# Patient Record
Sex: MALE | Race: BLACK OR AFRICAN AMERICAN | Employment: OTHER | URBAN - METROPOLITAN AREA
[De-identification: names, ages, dates, MRNs, and addresses within clinical notes are randomized per-mention and may not be internally consistent; named-entity substitution may affect disease eponyms.]

---

## 2022-03-18 PROBLEM — I48.0 PAROXYSMAL ATRIAL FIBRILLATION (HCC): Status: ACTIVE | Noted: 2017-04-20

## 2022-03-18 PROBLEM — I50.32 CHRONIC HEART FAILURE WITH PRESERVED EJECTION FRACTION (HFPEF) (HCC): Status: ACTIVE | Noted: 2022-01-01

## 2022-03-19 PROBLEM — I10 ESSENTIAL HYPERTENSION: Status: ACTIVE | Noted: 2017-07-10

## 2022-06-27 ENCOUNTER — OFFICE VISIT (OUTPATIENT)
Dept: CARDIOLOGY CLINIC | Age: 87
End: 2022-06-27
Payer: MEDICARE

## 2022-06-27 VITALS
HEIGHT: 67 IN | DIASTOLIC BLOOD PRESSURE: 62 MMHG | WEIGHT: 200.4 LBS | BODY MASS INDEX: 31.45 KG/M2 | SYSTOLIC BLOOD PRESSURE: 114 MMHG | HEART RATE: 60 BPM

## 2022-06-27 DIAGNOSIS — I48.11 LONGSTANDING PERSISTENT ATRIAL FIBRILLATION (HCC): Primary | ICD-10-CM

## 2022-06-27 DIAGNOSIS — I95.89 IATROGENIC HYPOTENSION: ICD-10-CM

## 2022-06-27 DIAGNOSIS — I50.32 CHRONIC HEART FAILURE WITH PRESERVED EJECTION FRACTION (HFPEF) (HCC): ICD-10-CM

## 2022-06-27 PROCEDURE — 99214 OFFICE O/P EST MOD 30 MIN: CPT | Performed by: INTERNAL MEDICINE

## 2022-06-27 PROCEDURE — 1036F TOBACCO NON-USER: CPT | Performed by: INTERNAL MEDICINE

## 2022-06-27 PROCEDURE — G8417 CALC BMI ABV UP PARAM F/U: HCPCS | Performed by: INTERNAL MEDICINE

## 2022-06-27 PROCEDURE — 1123F ACP DISCUSS/DSCN MKR DOCD: CPT | Performed by: INTERNAL MEDICINE

## 2022-06-27 PROCEDURE — G8427 DOCREV CUR MEDS BY ELIG CLIN: HCPCS | Performed by: INTERNAL MEDICINE

## 2022-06-27 RX ORDER — BRIMONIDINE TARTRATE 2 MG/ML
1 SOLUTION/ DROPS OPHTHALMIC 3 TIMES DAILY
COMMUNITY

## 2022-06-27 RX ORDER — CHOLECALCIFEROL (VITAMIN D3) 125 MCG
5 CAPSULE ORAL DAILY
COMMUNITY

## 2022-06-27 ASSESSMENT — ENCOUNTER SYMPTOMS: WHEEZING: 1

## 2022-06-27 NOTE — PROGRESS NOTES
[facility] CARDIOLOGY  [address], [address]  PHONE: [phone]      22    NAME:  Torey Franks  : 9/3/1931  MRN: [id number]         SUBJECTIVE:   Torey Franks is a 80 y.o. male seen for a follow up visit regarding the following:     Chief Complaint   Patient presents with    Congestive Heart Failure    Irregular Heart Beat    Hypertension            HPI:  Follow up  Congestive Heart Failure, Irregular Heart Beat, and Hypertension   . Follow up VT in setting of metabolic derangement with SIADH in 2018, afib rate controlled and anticoagulated, COPD  and HFpEF. Says he's been well until this morning, while he was sitting still in his chair he suddenly felt a \"jerk\" and felt back to normal as soon as he jerked. No pain, pressure or palpitations. He wears oxygen /. BP has been low at home. His wife says they've seen it as low as 90 systolic, highest has been 120/80          Past cardiac history:   2015 - VT occurred in the setting of significant metabolic derangement. All in [place]. Was placed on amiodarone, had outpatient follow up there with 30 day ambulatory monitoring showing no recurrence, with isolated PVC's. (amiodarone ultimately  discontinued)   Cardiac cath - no coronary obstruction, normal LVEF 60%. 2017 - 30 day monitor, single episode of rate controlled atrial fib -rate controlled, anticoagulated   2021- normal perfusion study ([facility])           Past Medical History, Past Surgical History, Family history, Social History, and Medications were all reviewed with the patient today and updated as necessary. Prior to Admission medications    Medication Sig Start Date End Date Taking?  Authorizing Provider   brimonidine (ALPHAGAN) 0.2 % ophthalmic solution 1 drop 3 times daily   Yes Historical Provider, MD   melatonin 5 MG TABS tablet Take 5 mg by mouth daily   Yes Historical Provider, MD   albuterol sulfate  (90 Base) MCG/ACT inhaler Inhale 2 puffs into the lungs every 6 hours as needed   Yes Ar Automatic Reconciliation   apixaban (ELIQUIS) 5 MG TABS tablet Take 5 mg by mouth 2 times daily 7/26/21  Yes Ar Automatic Reconciliation   budesonide-formoterol (SYMBICORT) 160-4.5 MCG/ACT AERO Inhale 2 puffs into the lungs as needed   Yes Ar Automatic Reconciliation   ergocalciferol (ERGOCALCIFEROL) 1.25 MG (35331 UT) capsule Take 50,000 Units by mouth every 7 days   Yes Ar Automatic Reconciliation   furosemide (LASIX) 40 MG tablet Take by mouth 3/29/22  Yes Ar Automatic Reconciliation   glimepiride (AMARYL) 1 MG tablet Take by mouth every morning (before breakfast)   Yes Ar Automatic Reconciliation   linaclotide (LINZESS) 145 MCG capsule Take by mouth as needed   Yes Ar Automatic Reconciliation   losartan (COZAAR) 50 MG tablet Take by mouth daily   Yes Ar Automatic Reconciliation   magnesium oxide (MAG-OX) 400 MG tablet Take 400 mg by mouth daily 6/19/20  Yes Ar Automatic Reconciliation   metoprolol tartrate (LOPRESSOR) 25 MG tablet Take 12.5 mg by mouth 2 times daily   Yes Ar Automatic Reconciliation   potassium chloride (KLOR-CON) 10 MEQ extended release tablet Take 10 mEq by mouth daily 6/17/21  Yes Ar Automatic Reconciliation   rosuvastatin (CRESTOR) 10 MG tablet Take 10 mg by mouth   Yes Ar Automatic Reconciliation   gabapentin (NEURONTIN) 300 MG capsule Take 300 mg by mouth. Patient not taking: Reported on 6/27/2022    Ar Automatic Reconciliation     Allergies   Allergen Reactions    Penicillins Other (See Comments)     constipation     Past Medical History:   Diagnosis Date    Chest pain 6/7/2016    OV:12/4/15 2/25/13: [facility]: negative for ischemia. Normal LVEF.  Feb 2015 - Cath ([place]) - no coronary obstruction, normal LVEF    Gastroesophageal reflux disease     Hyperlipemia     Hypertension     Ventricular tachycardia ([facility] [address] 6/7/2016    OV:12/4/2015 Feb 2015 - occurred in the setting of significant metabolic derangement. All in [place]. Was placed on amiodarone, had outpatient follow up there with 30 day ambulatory monitoring showing no recurrence, with isolated PVC's. Amiodarone started in hospital there,( I titrated down to once daily while procuring records), spironolactone and benicar discontinued. Cardiac cath - no coron     History reviewed. No pertinent surgical history. Family History   Problem Relation Age of Onset    Stroke Mother 80     Social History     Tobacco Use    Smoking status: Former Smoker     Quit date: 1983     Years since quittin.5    Smokeless tobacco: Never Used   Substance Use Topics    Alcohol use: Not on file       ROS:    Review of Systems   Cardiovascular: Negative for chest pain. Respiratory: Positive for wheezing (admits hasn't been using his inhalers, will resume today). PHYSICAL EXAM:   /62   Pulse 60   Ht 5' 7\" (1.702 m)   Wt 200 lb 6.4 oz (90.9 kg)   BMI 31.39 kg/m²      Wt Readings from Last 3 Encounters:   22 200 lb 6.4 oz (90.9 kg)   22 208 lb 6.4 oz (94.5 kg)   22 203 lb (92.1 kg)     BP Readings from Last 3 Encounters:   22 114/62   22 136/70   22 114/60     Pulse Readings from Last 3 Encounters:   22 60   22 76   22 64           Physical Exam  Constitutional:       Appearance: Normal appearance. HENT:      Head: Normocephalic and atraumatic. Eyes:      Conjunctiva/sclera: Conjunctivae normal.   Neck:      Vascular: No carotid bruit. Cardiovascular:      Rate and Rhythm: Normal rate. Rhythm irregular. Heart sounds: No murmur heard. No friction rub. No gallop. Pulmonary:      Effort: No respiratory distress. Breath sounds: Wheezing (diffuse end expiratory wheeze) present. No rales. Musculoskeletal:         General: Swelling (trace/1+ below knees bilaterally) present. Cervical back: Neck supple. Skin:     General: Skin is warm and dry.    Neurological: General: No focal deficit present. Mental Status: He is alert. Psychiatric:         Mood and Affect: Mood normal.         Behavior: Behavior normal.         Medical problems and test results were reviewed with the patient today. DATA REVIEW     5/10/22 1421    BUN 7 - 25 mg/dL 17    Sodium 133 - 146 mmol/L 145    Potassium 3.5 - 5.2 mmol/L 3.9    Chloride 100 - 111 mmol/L 106    CO2 23.0 - 32.6 mmol/L 33.2 High     Glucose 70 - 100 mg/dL 74    Creatinine 0.47 - 1.35 mg/dL 1.20    Calcium 8.9 - 10.3 mg/dL 9.2    Total Protein 6.1 - 8.0 g/dL 6.7    Albumin 3.5 - 4.9 g/dL 4.2    ALT (SGPT) 8 - 39 IU/L 14    Alkaline Phosphatase 36 - 108 IU/L 62    AST 12 - 33 IU/L 19    Total Bilirubin 0.0 - 1.5 mg/dL 0.8    BUN/Creatinine Ratio 8 - 23 14    Osmolality Calc 270 - 350 mOsm/kg 289    Anion Gap 6 - 13 mmol/L 6    Globulin 2.0 - 4.5 g/dL 2.5    A/G Ratio 0.9 - 2.4 1.7    Estimated GFR-African American mL/min/1.73 m^2 >60    Estimated GFR-Other mL/min/1.73 m^2 57                 EKG        CXR/IMAGING        DEVICE INTERROGATION        OUTSIDE RECORDS REVIEW    Records from outside providers have been reviewed and summarized as noted in the HPI, past history and data review sections of this note, and reviewed with patient. .       ASSESSMENT and PLAN    Kala Andrews was seen today for congestive heart failure, irregular heart beat and hypertension. Diagnoses and all orders for this visit:    Longstanding persistent atrial fibrillation ([facility] [address]  -     Basic Metabolic Panel; Future    Iatrogenic hypotension    Chronic heart failure with preserved ejection fraction (HFpEF) ([facility])          IMPRESSION:    Possibly symptomatic hypotension, stop amlodipine. Continuing low dose BB for rate control     Rate controlled, anticoagulated, continue meds, check Cr again in 6 months, reduce Eliquis to 2.5 mg if climbs > 1.5    NYHA II continue meds, diuresis with caution        Return in about 6 months (around 12/27/2022). Thank you for allowing me to participate in this patient's care. Please call or contact me if there are any questions or concerns regarding the above.       Ashley Alvarado MD  06/27/22  12:34 PM

## 2022-06-27 NOTE — PATIENT INSTRUCTIONS
Patient Education        Atrial Fibrillation: Care Instructions  Your Care Instructions     Atrial fibrillation is an irregular and often fast heartbeat. Treating this condition is important for several reasons. It can cause blood clots, which can travel from your heart to your brain and cause a stroke. If you have a fast heartbeat, you may feel lightheaded, dizzy, and weak. An irregular heartbeatcan also increase your risk for heart failure. Atrial fibrillation is often the result of another heart condition, such as high blood pressure or coronary artery disease. Making changes to improve yourheart condition will help you stay healthy and active. Follow-up care is a key part of your treatment and safety. Be sure to make and go to all appointments, and call your doctor if you are having problems. It's also a good idea to know your test results and keep alist of the medicines you take. How can you care for yourself at home? Medicines     Take your medicines exactly as prescribed. Call your doctor if you think you are having a problem with your medicine. You will get more details on the specific medicines your doctor prescribes.      If your doctor has given you a blood thinner to prevent a stroke, be sure you get instructions about how to take your medicine safely. Blood thinners can cause serious bleeding problems.      Do not take any vitamins, over-the-counter drugs, or herbal products without talking to your doctor first.   Lifestyle changes     Do not smoke. Smoking can increase your chance of a stroke and heart attack. If you need help quitting, talk to your doctor about stop-smoking programs and medicines. These can increase your chances of quitting for good.      Eat a heart-healthy diet.      Stay at a healthy weight. Lose weight if you need to.      Limit alcohol to 2 drinks a day for men and 1 drink a day for women. Too much alcohol can cause health problems.      Avoid colds and flu.  Get a pneumococcal vaccine shot. If you have had one before, ask your doctor whether you need another dose. Get a flu shot every year. If you must be around people with colds or flu, wash your hands often. Activity     If your doctor recommends it, get more exercise. Walking is a good choice. Bit by bit, increase the amount you walk every day. Try for at least 30 minutes on most days of the week. You also may want to swim, bike, or do other activities. Your doctor may suggest that you join a cardiac rehabilitation program so that you can have help increasing your physical activity safely.      Start light exercise if your doctor says it is okay. Even a small amount will help you get stronger, have more energy, and manage stress. Walking is an easy way to get exercise. Start out by walking a little more than you did in the hospital. Gradually increase the amount you walk.      When you exercise, watch for signs that your heart is working too hard. You are pushing too hard if you cannot talk while you are exercising. If you become short of breath or dizzy or have chest pain, sit down and rest immediately.      Check your pulse regularly. Place two fingers on the artery at the palm side of your wrist, in line with your thumb. If your heartbeat seems uneven or fast, talk to your doctor. When should you call for help? Call 911 anytime you think you may need emergency care. For example, call if:     You have symptoms of a heart attack. These may include:  ? Chest pain or pressure, or a strange feeling in the chest.  ? Sweating. ? Shortness of breath. ? Nausea or vomiting. ? Pain, pressure, or a strange feeling in the back, neck, jaw, or upper belly or in one or both shoulders or arms. ? Lightheadedness or sudden weakness. ? A fast or irregular heartbeat. After you call 911, the  may tell you to chew 1 adult-strength or 2 to 4 low-dose aspirin. Wait for an ambulance.  Do not try to drive yourself.      You have symptoms of a stroke. These may include:  ? Sudden numbness, tingling, weakness, or loss of movement in your face, arm, or leg, especially on only one side of your body. ? Sudden vision changes. ? Sudden trouble speaking. ? Sudden confusion or trouble understanding simple statements. ? Sudden problems with walking or balance. ? A sudden, severe headache that is different from past headaches.      You passed out (lost consciousness). Call your doctor now or seek immediate medical care if:     You have new or increased shortness of breath.      You feel dizzy or lightheaded, or you feel like you may faint.      Your heart rate becomes irregular.      You can feel your heart flutter in your chest or skip heartbeats. Tell your doctor if these symptoms are new or worse. Watch closely for changes in your health, and be sure to contact your doctor ifyou have any problems. Where can you learn more? Go to https://MC10.NxThera. org and sign in to your [employer] account. Enter U020 in the NanoInk box to learn more about \"Atrial Fibrillation: Care Instructions. \"     If you do not have an account, please click on the \"Sign Up Now\" link. Current as of: January 10, 2022               Content Version: 13.3  © 2006-2022 Healthwise, Incorporated. Care instructions adapted under license by [facility] ([facility]). If you have questions about a medical condition or this instruction, always ask your healthcare professional. [place] [address] any warranty or liability for your use of this information.

## 2022-07-06 ENCOUNTER — TELEPHONE (OUTPATIENT)
Dept: CARDIOLOGY CLINIC | Age: 87
End: 2022-07-06

## 2022-07-06 NOTE — TELEPHONE ENCOUNTER
Pt's EC states pt c/o R. Foot swelling for \"a couple of days. \" states it is intermittent. Goes down at bedtime and comes back when he's up and comes back when he gets up. Admits to intermittent ankle pain in RLE. Weight is down 4#. Recommend calling PCP back and making them aware it does not sound like his heart. States he has appt with PCP this afternoon. Will have them eval at that time.

## 2022-09-09 DIAGNOSIS — I48.11 LONGSTANDING PERSISTENT ATRIAL FIBRILLATION (HCC): ICD-10-CM

## 2022-09-09 LAB
ANION GAP SERPL CALC-SCNC: 2 MMOL/L (ref 4–13)
BUN SERPL-MCNC: 20 MG/DL (ref 8–23)
CALCIUM SERPL-MCNC: 9 MG/DL (ref 8.3–10.4)
CHLORIDE SERPL-SCNC: 105 MMOL/L (ref 101–110)
CO2 SERPL-SCNC: 38 MMOL/L (ref 21–32)
CREAT SERPL-MCNC: 1.1 MG/DL (ref 0.8–1.5)
GLUCOSE SERPL-MCNC: 78 MG/DL (ref 65–100)
POTASSIUM SERPL-SCNC: 3.6 MMOL/L (ref 3.5–5.1)
SODIUM SERPL-SCNC: 145 MMOL/L (ref 136–145)

## 2022-09-29 ENCOUNTER — OFFICE VISIT (OUTPATIENT)
Dept: CARDIOLOGY CLINIC | Age: 87
End: 2022-09-29
Payer: MEDICARE

## 2022-09-29 VITALS
BODY MASS INDEX: 32.15 KG/M2 | HEIGHT: 67 IN | DIASTOLIC BLOOD PRESSURE: 66 MMHG | WEIGHT: 204.8 LBS | SYSTOLIC BLOOD PRESSURE: 132 MMHG | HEART RATE: 83 BPM

## 2022-09-29 DIAGNOSIS — N18.31 STAGE 3A CHRONIC KIDNEY DISEASE (HCC): ICD-10-CM

## 2022-09-29 DIAGNOSIS — I48.0 PAROXYSMAL ATRIAL FIBRILLATION (HCC): ICD-10-CM

## 2022-09-29 DIAGNOSIS — I47.20 VENTRICULAR TACHYCARDIA: ICD-10-CM

## 2022-09-29 DIAGNOSIS — I50.32 CHRONIC HEART FAILURE WITH PRESERVED EJECTION FRACTION (HFPEF) (HCC): Primary | ICD-10-CM

## 2022-09-29 DIAGNOSIS — I10 ESSENTIAL HYPERTENSION: ICD-10-CM

## 2022-09-29 PROCEDURE — 99214 OFFICE O/P EST MOD 30 MIN: CPT | Performed by: INTERNAL MEDICINE

## 2022-09-29 PROCEDURE — G8417 CALC BMI ABV UP PARAM F/U: HCPCS | Performed by: INTERNAL MEDICINE

## 2022-09-29 PROCEDURE — 1036F TOBACCO NON-USER: CPT | Performed by: INTERNAL MEDICINE

## 2022-09-29 PROCEDURE — 93000 ELECTROCARDIOGRAM COMPLETE: CPT | Performed by: INTERNAL MEDICINE

## 2022-09-29 PROCEDURE — 1123F ACP DISCUSS/DSCN MKR DOCD: CPT | Performed by: INTERNAL MEDICINE

## 2022-09-29 PROCEDURE — G8427 DOCREV CUR MEDS BY ELIG CLIN: HCPCS | Performed by: INTERNAL MEDICINE

## 2022-09-29 RX ORDER — AMLODIPINE BESYLATE 5 MG/1
5 TABLET ORAL DAILY
COMMUNITY

## 2022-09-29 ASSESSMENT — ENCOUNTER SYMPTOMS: BACK PAIN: 1

## 2022-09-29 NOTE — PATIENT INSTRUCTIONS
Patient Education        Atrial Fibrillation: Care Instructions  Your Care Instructions     Atrial fibrillation is an irregular and often fast heartbeat. Treating this condition is important for several reasons. It can cause blood clots, which can travel from your heart to your brain and cause a stroke. If you have a fast heartbeat, you may feel lightheaded, dizzy, and weak. An irregular heartbeat can also increase your risk for heart failure. Atrial fibrillation is often the result of another heart condition, such as high blood pressure or coronary artery disease. Making changes to improve your heart condition will help you stay healthy and active. Follow-up care is a key part of your treatment and safety. Be sure to make and go to all appointments, and call your doctor if you are having problems. It's also a good idea to know your test results and keep a list of the medicines you take. How can you care for yourself at home? Medicines    Take your medicines exactly as prescribed. Call your doctor if you think you are having a problem with your medicine. You will get more details on the specific medicines your doctor prescribes. If your doctor has given you a blood thinner to prevent a stroke, be sure you get instructions about how to take your medicine safely. Blood thinners can cause serious bleeding problems. Do not take any vitamins, over-the-counter drugs, or herbal products without talking to your doctor first.   Lifestyle changes    Do not smoke. Smoking can increase your chance of a stroke and heart attack. If you need help quitting, talk to your doctor about stop-smoking programs and medicines. These can increase your chances of quitting for good. Eat a heart-healthy diet. Stay at a healthy weight. Lose weight if you need to. Limit alcohol to 2 drinks a day for men and 1 drink a day for women. Too much alcohol can cause health problems. Avoid colds and flu.  Get a pneumococcal vaccine shot. If you have had one before, ask your doctor whether you need another dose. Get a flu shot every year. If you must be around people with colds or flu, wash your hands often. Activity    If your doctor recommends it, get more exercise. Walking is a good choice. Bit by bit, increase the amount you walk every day. Try for at least 30 minutes on most days of the week. You also may want to swim, bike, or do other activities. Your doctor may suggest that you join a cardiac rehabilitation program so that you can have help increasing your physical activity safely. Start light exercise if your doctor says it is okay. Even a small amount will help you get stronger, have more energy, and manage stress. Walking is an easy way to get exercise. Start out by walking a little more than you did in the hospital. Gradually increase the amount you walk. When you exercise, watch for signs that your heart is working too hard. You are pushing too hard if you cannot talk while you are exercising. If you become short of breath or dizzy or have chest pain, sit down and rest immediately. Check your pulse regularly. Place two fingers on the artery at the palm side of your wrist, in line with your thumb. If your heartbeat seems uneven or fast, talk to your doctor. When should you call for help? Call 911 anytime you think you may need emergency care. For example, call if:    You have symptoms of a heart attack. These may include:  Chest pain or pressure, or a strange feeling in the chest.  Sweating. Shortness of breath. Nausea or vomiting. Pain, pressure, or a strange feeling in the back, neck, jaw, or upper belly or in one or both shoulders or arms. Lightheadedness or sudden weakness. A fast or irregular heartbeat. After you call 911, the  may tell you to chew 1 adult-strength or 2 to 4 low-dose aspirin. Wait for an ambulance. Do not try to drive yourself. You have symptoms of a stroke.  These may include:  Sudden numbness, tingling, weakness, or loss of movement in your face, arm, or leg, especially on only one side of your body. Sudden vision changes. Sudden trouble speaking. Sudden confusion or trouble understanding simple statements. Sudden problems with walking or balance. A sudden, severe headache that is different from past headaches. You passed out (lost consciousness). Call your doctor now or seek immediate medical care if:    You have new or increased shortness of breath. You feel dizzy or lightheaded, or you feel like you may faint. Your heart rate becomes irregular. You can feel your heart flutter in your chest or skip heartbeats. Tell your doctor if these symptoms are new or worse. Watch closely for changes in your health, and be sure to contact your doctor if you have any problems. Where can you learn more? Go to https://Askablogrpemeleweb.1000 [employer]ks. org and sign in to your [employer] account. Enter U020 in the Zitra.com box to learn more about \"Atrial Fibrillation: Care Instructions. \"     If you do not have an account, please click on the \"Sign Up Now\" link. Current as of: January 10, 2022               Content Version: 13.4  © 8904-8036 Healthwise, Incorporated. Care instructions adapted under license by [facility] ([facility]). If you have questions about a medical condition or this instruction, always ask your healthcare professional. [address] any warranty or liability for your use of this information.        .bfwb

## 2022-09-29 NOTE — PROGRESS NOTES
[facility] CARDIOLOGY  [address] [place] [address], [address], [address] [place] [address]  PHONE: [phone]      22    NAME:  Héctor Minor  : 9/3/1931  MRN: [id number]         SUBJECTIVE:   Héctor Minor is a 80 y.o. male seen for a follow up visit regarding the following:     Chief Complaint   Patient presents with    Atrial Fibrillation              HPI:  Follow up  Atrial Fibrillation   . Follow up VT in setting of metabolic derangement with SIADH in 2018, afib rate controlled and anticoagulated, COPD  and HFpEF. Stopped amlodipine last visit for symptomatic hypotension     He feels well for his 89 years. He's had no further hypotension. Had some high readings but started taking beet juice which has helped. Diffuse severe back and neck pain, was a  in his youth       Past cardiac history:   2015 - VT occurred in the setting of significant metabolic derangement. All in [place]. Was placed on amiodarone, had outpatient follow up there with 30 day ambulatory monitoring showing no recurrence, with isolated PVC's. (amiodarone ultimately  discontinued)   Cardiac cath - no coronary obstruction, normal LVEF 60%.     2017 - 30 day monitor, single episode of rate controlled atrial fib -rate controlled, anticoagulated   2021- normal perfusion study ([facility])           Key CAD CHF Meds            amLODIPine (NORVASC) 5 MG tablet (Taking)    Class: Historical Med    apixaban (ELIQUIS) 5 MG TABS tablet (Taking)    Class: Historical Med    furosemide (LASIX) 40 MG tablet (Taking)    Class: Historical Med    losartan (COZAAR) 50 MG tablet (Taking)    Class: Historical Med    metoprolol tartrate (LOPRESSOR) 25 MG tablet (Taking)    Class: Historical Med    rosuvastatin (CRESTOR) 10 MG tablet (Taking)    Class: Historical Med              Past Medical History, Past Surgical History, Family history, Social History, and Medications were all reviewed with the patient today and updated as necessary. Prior to Admission medications    Medication Sig Start Date End Date Taking?  Authorizing Provider   amLODIPine (NORVASC) 5 MG tablet Take 5 mg by mouth daily   Yes Historical Provider, MD   brimonidine (ALPHAGAN) 0.2 % ophthalmic solution 1 drop 3 times daily   Yes Historical Provider, MD   melatonin 5 MG TABS tablet Take 5 mg by mouth daily   Yes Historical Provider, MD   albuterol sulfate  (90 Base) MCG/ACT inhaler Inhale 2 puffs into the lungs every 6 hours as needed   Yes Ar Automatic Reconciliation   apixaban (ELIQUIS) 5 MG TABS tablet Take 5 mg by mouth 2 times daily 7/26/21  Yes Ar Automatic Reconciliation   budesonide-formoterol (SYMBICORT) 160-4.5 MCG/ACT AERO Inhale 2 puffs into the lungs as needed   Yes Ar Automatic Reconciliation   ergocalciferol (ERGOCALCIFEROL) 1.25 MG (72792 UT) capsule Take 50,000 Units by mouth every 7 days   Yes Ar Automatic Reconciliation   furosemide (LASIX) 40 MG tablet Take 40 mg by mouth 1 qam and 1/2 at lunch 3/29/22  Yes Ar Automatic Reconciliation   glimepiride (AMARYL) 1 MG tablet Take by mouth every morning (before breakfast)   Yes Ar Automatic Reconciliation   linaclotide (LINZESS) 145 MCG capsule Take by mouth as needed   Yes Ar Automatic Reconciliation   losartan (COZAAR) 50 MG tablet Take by mouth daily   Yes Ar Automatic Reconciliation   magnesium oxide (MAG-OX) 400 MG tablet Take 400 mg by mouth daily 6/19/20  Yes Ar Automatic Reconciliation   metoprolol tartrate (LOPRESSOR) 25 MG tablet Take 12.5 mg by mouth 2 times daily   Yes Ar Automatic Reconciliation   potassium chloride (KLOR-CON) 10 MEQ extended release tablet Take 10 mEq by mouth daily 6/17/21  Yes Ar Automatic Reconciliation   rosuvastatin (CRESTOR) 10 MG tablet Take 10 mg by mouth   Yes Ar Automatic Reconciliation     Allergies   Allergen Reactions    Penicillins Other (See Comments)     constipation     Past Medical History:   Diagnosis Date    Chest pain 6/7/2016 OV:12/4/15 2/25/13: [facility]: negative for ischemia. Normal LVEF. 2015 - Cath ([place]) - no coronary obstruction, normal LVEF    Gastroesophageal reflux disease     Hyperlipemia     Hypertension     Ventricular tachycardia ([address] 2016    OV:2015 - occurred in the setting of significant metabolic derangement. All in [facility]. Was placed on amiodarone, had outpatient follow up there with 30 day ambulatory monitoring showing no recurrence, with isolated PVC's. Amiodarone started in hospital there,( I titrated down to once daily while procuring records), spironolactone and benicar discontinued. Cardiac cath - no coron     History reviewed. No pertinent surgical history. Family History   Problem Relation Age of Onset    Stroke Mother 80     Social History     Tobacco Use    Smoking status: Former     Types: Cigarettes     Quit date: 1983     Years since quittin.7    Smokeless tobacco: Never   Substance Use Topics    Alcohol use: Not on file       ROS:    Review of Systems   Constitutional: Positive for malaise/fatigue. Cardiovascular:  Negative for chest pain. Musculoskeletal:  Positive for arthritis, back pain, joint pain and neck pain. PHYSICAL EXAM:   /66   Pulse 83   Ht 5' 7\" (1.702 m)   Wt 204 lb 12.8 oz (92.9 kg)   BMI 32.08 kg/m²      Wt Readings from Last 3 Encounters:   22 204 lb 12.8 oz (92.9 kg)   22 200 lb 6.4 oz (90.9 kg)   22 208 lb 6.4 oz (94.5 kg)     BP Readings from Last 3 Encounters:   22 132/66   22 114/62   22 136/70     Pulse Readings from Last 3 Encounters:   22 83   22 60   22 76           Physical Exam  Constitutional:       Appearance: Normal appearance. HENT:      Head: Normocephalic and atraumatic. Eyes:      Conjunctiva/sclera: Conjunctivae normal.   Neck:      Vascular: No carotid bruit. Cardiovascular:      Rate and Rhythm: Normal rate. Rhythm irregular. FrequentExtrasystoles are present. Heart sounds: No murmur heard. No friction rub. No gallop. Pulmonary:      Effort: No respiratory distress. Breath sounds: No wheezing or rales. Musculoskeletal:         General: Swelling (trace pitting bilateral ankels) present. Cervical back: Neck supple. Skin:     General: Skin is warm and dry. Neurological:      General: No focal deficit present. Mental Status: He is alert. Psychiatric:         Mood and Affect: Mood normal.         Behavior: Behavior normal.       Medical problems and test results were reviewed with the patient today. DATA REVIEW    BMP  Lab Results   Component Value Date/Time     09/09/2022 12:34 PM    K 3.6 09/09/2022 12:34 PM     09/09/2022 12:34 PM    CO2 38 09/09/2022 12:34 PM    BUN 20 09/09/2022 12:34 PM    CREATININE 1.10 09/09/2022 12:34 PM    GLUCOSE 78 09/09/2022 12:34 PM    CALCIUM 9.0 09/09/2022 12:34 PM       9/21/22 1036    Sodium 133 - 146 mmol/L 143    Potassium 3.5 - 5.2 mmol/L 4.2    Chloride 100 - 111 mmol/L 105    Carbon Dioxide 23 - 32.6 mmol/L 30.0    Anion Gap 6 - 13 mmol/L 8    Osmolality Calculation 271 - 318 mOsm/kg 287.29    Urea Nitrogen 7 - 25 mg/dL 20    Creatinine 0.47 - 1.35 mg/dL 1.21    BUN/Creat Ratio 8 - 20 NULL 16.53    Glucose 70 - 100 mg/dL 93    Calcium 8.9 - 10.3 mg/dL 9.3    Corrected Calcium 8.9 - 10.3 mg/dL 9.5    Total Protein 6.1 - 8 g/dL 6.0 Low     Albumin 3.5 - 4.9 g/dL 3.7    Globulin 2 - 3.7 g/dL 2.30    Alanine Transferase 8 - 39 IU/L 14    Aspartamine Transferase 12 - 33 IU/L 19    Alkaline Phosphatase 36 - 108 IU/L 48    Total Bilirubin 0 - 1.5 mg/dL 0.7    Albumin/Globulin Ratio 1 - 2 2    eGFR Do Not Use the lab's eGFR calculation for medication dose adjustments per Pharmacy.  mL/min/1.73m*2 56.5 Low       9/21/22 1036     HEMOGLOBIN A1C <5.7 % 5.4      7/6/22  TG 74 HDL 42 Ldl 57    EKG    Sinus rhythm with profound first degree AV block, 83  frequent PAC  possible anteroseptal infarct, age undetermined  left axis deviation d/t LAFB  Nstwf    No change from previous    CXR/IMAGING        DEVICE INTERROGATION        OUTSIDE RECORDS REVIEW    Records from outside providers have been reviewed and summarized as noted in the HPI, past history and data review sections of this note, and reviewed with patient. .       ASSESSMENT and PLAN    Jessica Padilla was seen today for atrial fibrillation. Diagnoses and all orders for this visit:    Chronic heart failure with preserved ejection fraction (HFpEF) (HCC)    Paroxysmal atrial fibrillation (HCC)  -     EKG 12 Lead    Ventricular tachycardia (HCC)    Essential hypertension    Stage 3a chronic kidney disease ([facility] [address]        IMPRESSION:     Rate controlled, anticoagulated, continue meds. Reviewed labs, eliquis dosing remains correct. No further VT, occurred with metabolic derangement    No angina    Stable CKD consistent with age    BP is controlled on current regimen without recurrent orthostatic symptoms, will stay the course. Return in about 6 months (around 3/29/2023). Thank you for allowing me to participate in this patient's care. Please call or contact me if there are any questions or concerns regarding the above.       Lianne Cottrell MD  09/29/22  11:11 AM

## 2022-10-05 NOTE — TELEPHONE ENCOUNTER
MEDICATION REFILL REQUEST      Name of Medication:  Eliquis  Dose:  5 mg  Frequency:  2 times a day  Quantity:  180  Days' supply:  90    Pharmacy Name/Location:  [facility] mail order pharmacy

## 2022-10-05 NOTE — TELEPHONE ENCOUNTER
Medication, strength, directions and requested pharmacy reviewed/verified.  Prescription pending physician's approval.